# Patient Record
Sex: MALE | Race: ASIAN | NOT HISPANIC OR LATINO
[De-identification: names, ages, dates, MRNs, and addresses within clinical notes are randomized per-mention and may not be internally consistent; named-entity substitution may affect disease eponyms.]

---

## 2019-03-15 PROBLEM — Z00.00 ENCOUNTER FOR PREVENTIVE HEALTH EXAMINATION: Status: ACTIVE | Noted: 2019-03-15

## 2019-03-18 ENCOUNTER — APPOINTMENT (OUTPATIENT)
Dept: SURGERY | Facility: CLINIC | Age: 64
End: 2019-03-18
Payer: COMMERCIAL

## 2019-03-18 VITALS
BODY MASS INDEX: 37.12 KG/M2 | TEMPERATURE: 97.9 F | SYSTOLIC BLOOD PRESSURE: 122 MMHG | DIASTOLIC BLOOD PRESSURE: 79 MMHG | HEART RATE: 83 BPM | HEIGHT: 66 IN | WEIGHT: 231 LBS

## 2019-03-18 DIAGNOSIS — Z82.49 FAMILY HISTORY OF ISCHEMIC HEART DISEASE AND OTHER DISEASES OF THE CIRCULATORY SYSTEM: ICD-10-CM

## 2019-03-18 DIAGNOSIS — Z86.39 PERSONAL HISTORY OF OTHER ENDOCRINE, NUTRITIONAL AND METABOLIC DISEASE: ICD-10-CM

## 2019-03-18 DIAGNOSIS — Z83.3 FAMILY HISTORY OF DIABETES MELLITUS: ICD-10-CM

## 2019-03-18 DIAGNOSIS — K42.9 UMBILICAL HERNIA W/OUT OBSTRUCTION OR GANGRENE: ICD-10-CM

## 2019-03-18 DIAGNOSIS — Z78.9 OTHER SPECIFIED HEALTH STATUS: ICD-10-CM

## 2019-03-18 PROCEDURE — 99243 OFF/OP CNSLTJ NEW/EST LOW 30: CPT

## 2019-03-18 RX ORDER — METFORMIN HYDROCHLORIDE 625 MG/1
TABLET ORAL
Refills: 0 | Status: ACTIVE | COMMUNITY

## 2019-03-18 RX ORDER — LEVOTHYROXINE SODIUM 75 UG/1
75 TABLET ORAL
Refills: 0 | Status: ACTIVE | COMMUNITY

## 2019-03-18 NOTE — HISTORY OF PRESENT ILLNESS
[de-identified] : 62 yo obese male with h/o DM presents fro evaluation of an umbilical hernia which he has had for several years. He states the hernia has recently become symptomatic and has gotten slightly larger as well. He denies any nausea or vomiting. No h/o hernia surgery in the past.

## 2019-03-18 NOTE — ASSESSMENT
[FreeTextEntry1] : 64 yo male with a symptomatic umbilical hernia. Will schedule out patient open repair.

## 2019-03-18 NOTE — REASON FOR VISIT
[Consultation] : a consultation visit [Family Member] : family member [FreeTextEntry1] : umbilical hernia

## 2019-03-18 NOTE — PHYSICAL EXAM
[Respiratory Effort] : normal respiratory effort [Alert] : alert [Oriented to Person] : oriented to person [Oriented to Place] : oriented to place [Calm] : calm [Oriented to Time] : oriented to time [JVD] : no jugular venous distention  [Abdomen Tenderness] : ~T ~M No abdominal tenderness [Abdominal Masses] : No abdominal masses [Enlarged] : not enlarged [Tender] : nontender [de-identified] : AKIKO JORGE NAD [de-identified] : soft NT ND + partially reducible umbilical hernia  [de-identified] : EOMI

## 2019-03-25 ENCOUNTER — OUTPATIENT (OUTPATIENT)
Dept: OUTPATIENT SERVICES | Facility: HOSPITAL | Age: 64
LOS: 1 days | End: 2019-03-25
Payer: COMMERCIAL

## 2019-03-25 VITALS
OXYGEN SATURATION: 97 % | TEMPERATURE: 98 F | DIASTOLIC BLOOD PRESSURE: 82 MMHG | WEIGHT: 225.97 LBS | HEIGHT: 66 IN | HEART RATE: 84 BPM | SYSTOLIC BLOOD PRESSURE: 130 MMHG | RESPIRATION RATE: 16 BRPM

## 2019-03-25 DIAGNOSIS — R94.31 ABNORMAL ELECTROCARDIOGRAM [ECG] [EKG]: ICD-10-CM

## 2019-03-25 DIAGNOSIS — K46.9 UNSPECIFIED ABDOMINAL HERNIA WITHOUT OBSTRUCTION OR GANGRENE: ICD-10-CM

## 2019-03-25 DIAGNOSIS — E11.9 TYPE 2 DIABETES MELLITUS WITHOUT COMPLICATIONS: ICD-10-CM

## 2019-03-25 DIAGNOSIS — K42.9 UMBILICAL HERNIA WITHOUT OBSTRUCTION OR GANGRENE: ICD-10-CM

## 2019-03-25 DIAGNOSIS — T78.40XA ALLERGY, UNSPECIFIED, INITIAL ENCOUNTER: ICD-10-CM

## 2019-03-25 DIAGNOSIS — Z98.890 OTHER SPECIFIED POSTPROCEDURAL STATES: Chronic | ICD-10-CM

## 2019-03-25 DIAGNOSIS — G47.33 OBSTRUCTIVE SLEEP APNEA (ADULT) (PEDIATRIC): ICD-10-CM

## 2019-03-25 LAB
ANION GAP SERPL CALC-SCNC: 14 MMO/L — SIGNIFICANT CHANGE UP (ref 7–14)
BUN SERPL-MCNC: 10 MG/DL — SIGNIFICANT CHANGE UP (ref 7–23)
CALCIUM SERPL-MCNC: 9.5 MG/DL — SIGNIFICANT CHANGE UP (ref 8.4–10.5)
CHLORIDE SERPL-SCNC: 98 MMOL/L — SIGNIFICANT CHANGE UP (ref 98–107)
CO2 SERPL-SCNC: 25 MMOL/L — SIGNIFICANT CHANGE UP (ref 22–31)
CREAT SERPL-MCNC: 0.77 MG/DL — SIGNIFICANT CHANGE UP (ref 0.5–1.3)
GLUCOSE SERPL-MCNC: 251 MG/DL — HIGH (ref 70–99)
HBA1C BLD-MCNC: 9.6 % — HIGH (ref 4–5.6)
HCT VFR BLD CALC: 42.5 % — SIGNIFICANT CHANGE UP (ref 39–50)
HGB BLD-MCNC: 13.8 G/DL — SIGNIFICANT CHANGE UP (ref 13–17)
MCHC RBC-ENTMCNC: 27.4 PG — SIGNIFICANT CHANGE UP (ref 27–34)
MCHC RBC-ENTMCNC: 32.5 % — SIGNIFICANT CHANGE UP (ref 32–36)
MCV RBC AUTO: 84.3 FL — SIGNIFICANT CHANGE UP (ref 80–100)
NRBC # FLD: 0 K/UL — SIGNIFICANT CHANGE UP (ref 0–0)
PLATELET # BLD AUTO: 183 K/UL — SIGNIFICANT CHANGE UP (ref 150–400)
PMV BLD: 12.2 FL — SIGNIFICANT CHANGE UP (ref 7–13)
POTASSIUM SERPL-MCNC: 4 MMOL/L — SIGNIFICANT CHANGE UP (ref 3.5–5.3)
POTASSIUM SERPL-SCNC: 4 MMOL/L — SIGNIFICANT CHANGE UP (ref 3.5–5.3)
RBC # BLD: 5.04 M/UL — SIGNIFICANT CHANGE UP (ref 4.2–5.8)
RBC # FLD: 12.7 % — SIGNIFICANT CHANGE UP (ref 10.3–14.5)
SODIUM SERPL-SCNC: 137 MMOL/L — SIGNIFICANT CHANGE UP (ref 135–145)
WBC # BLD: 11.75 K/UL — HIGH (ref 3.8–10.5)
WBC # FLD AUTO: 11.75 K/UL — HIGH (ref 3.8–10.5)

## 2019-03-25 PROCEDURE — 93010 ELECTROCARDIOGRAM REPORT: CPT

## 2019-03-25 NOTE — H&P PST ADULT - LAST CARDIAC ANGIOGRAM/IMAGING
" few years ago " @ [facility] " few years ago " @ [facility]; pt denies stent

## 2019-03-25 NOTE — H&P PST ADULT - LYMPHATIC
posterior cervical R/posterior cervical L/anterior cervical L/supraclavicular R/axillary R/supraclavicular L

## 2019-03-25 NOTE — H&P PST ADULT - NSICDXPROBLEM_GEN_ALL_CORE_FT
PROBLEM DIAGNOSES  Problem: Hernia  Assessment and Plan: Ventral Hernia Repair  Pre op instructions reviewed with pt ; pt appears to have a good understanding of pre op instructions  Pt to Dr Hogan for pre op cardiac evaluation    Problem: MIGUEL ÁNGEL (obstructive sleep apnea)  Assessment and Plan: Pt to bring cpap dos  OR booking notified of MIGUEL ÁNGEL    Problem: Diabetes mellitus  Assessment and Plan: BMP, HGBA1C done 3/26/19  Pt to hold metformin evening prior to surgery and dos  FS dos  OR booking notified via fax    Problem: Abnormal EKG  Assessment and Plan: EKG done ; comparison obtained   s/w Dr Hogan ; pt cardiologist ; Dr Hogan to clear pt for surgery  EKG faxed to Dr Hogan     Problem: Allergy  Assessment and Plan: PCN, Shellfish  OR booking notified via fax

## 2019-03-25 NOTE — H&P PST ADULT - HISTORY OF PRESENT ILLNESS
Pt is a 63 y.o. male ; pt reports h/o hernia ; pt states it has increased in size ; pt to surgeon ; pt now presents for  Ventral Hernia repair

## 2019-03-27 PROBLEM — E03.9 HYPOTHYROIDISM, UNSPECIFIED: Chronic | Status: ACTIVE | Noted: 2019-03-25

## 2019-03-27 PROBLEM — R94.31 ABNORMAL ELECTROCARDIOGRAM [ECG] [EKG]: Chronic | Status: ACTIVE | Noted: 2019-03-25

## 2019-04-07 ENCOUNTER — TRANSCRIPTION ENCOUNTER (OUTPATIENT)
Age: 64
End: 2019-04-07

## 2019-04-08 ENCOUNTER — APPOINTMENT (OUTPATIENT)
Dept: SURGERY | Facility: AMBULATORY SURGERY CENTER | Age: 64
End: 2019-04-08

## 2019-04-08 ENCOUNTER — OUTPATIENT (OUTPATIENT)
Dept: OUTPATIENT SERVICES | Facility: HOSPITAL | Age: 64
LOS: 1 days | Discharge: HOME | End: 2019-04-08
Payer: COMMERCIAL

## 2019-04-08 VITALS — SYSTOLIC BLOOD PRESSURE: 132 MMHG | OXYGEN SATURATION: 99 % | DIASTOLIC BLOOD PRESSURE: 80 MMHG | HEART RATE: 85 BPM

## 2019-04-08 VITALS
RESPIRATION RATE: 16 BRPM | OXYGEN SATURATION: 97 % | WEIGHT: 225.97 LBS | TEMPERATURE: 98 F | HEIGHT: 66 IN | HEART RATE: 73 BPM | SYSTOLIC BLOOD PRESSURE: 146 MMHG | DIASTOLIC BLOOD PRESSURE: 99 MMHG

## 2019-04-08 DIAGNOSIS — Z98.890 OTHER SPECIFIED POSTPROCEDURAL STATES: Chronic | ICD-10-CM

## 2019-04-08 DIAGNOSIS — K42.9 UMBILICAL HERNIA WITHOUT OBSTRUCTION OR GANGRENE: ICD-10-CM

## 2019-04-08 LAB — GLUCOSE BLDC GLUCOMTR-MCNC: 175 MG/DL — HIGH (ref 70–99)

## 2019-04-08 PROCEDURE — 49568: CPT

## 2019-04-08 PROCEDURE — 49561: CPT

## 2019-04-08 RX ORDER — SODIUM CHLORIDE 9 MG/ML
1000 INJECTION, SOLUTION INTRAVENOUS
Qty: 0 | Refills: 0 | Status: DISCONTINUED | OUTPATIENT
Start: 2019-04-08 | End: 2019-04-23

## 2019-04-08 RX ORDER — OXYCODONE AND ACETAMINOPHEN 5; 325 MG/1; MG/1
1 TABLET ORAL
Qty: 15 | Refills: 0
Start: 2019-04-08

## 2019-04-08 NOTE — ASU DISCHARGE PLAN (ADULT/PEDIATRIC) - CALL YOUR DOCTOR IF YOU HAVE ANY OF THE FOLLOWING:
Fever greater than (need to indicate Fahrenheit or Celsius)/Bleeding that does not stop/Wound/Surgical Site with redness, or foul smelling discharge or pus Nausea and vomiting that does not stop/Wound/Surgical Site with redness, or foul smelling discharge or pus/Fever greater than (need to indicate Fahrenheit or Celsius)/Pain not relieved by Medications/Bleeding that does not stop

## 2019-04-15 ENCOUNTER — APPOINTMENT (OUTPATIENT)
Dept: SURGERY | Facility: CLINIC | Age: 64
End: 2019-04-15
Payer: COMMERCIAL

## 2019-04-15 VITALS
SYSTOLIC BLOOD PRESSURE: 137 MMHG | HEART RATE: 83 BPM | DIASTOLIC BLOOD PRESSURE: 82 MMHG | WEIGHT: 226 LBS | BODY MASS INDEX: 36.32 KG/M2 | HEIGHT: 66 IN | TEMPERATURE: 98.3 F

## 2019-04-15 DIAGNOSIS — Z09 ENCOUNTER FOR FOLLOW-UP EXAMINATION AFTER COMPLETED TREATMENT FOR CONDITIONS OTHER THAN MALIGNANT NEOPLASM: ICD-10-CM

## 2019-04-15 PROCEDURE — 99024 POSTOP FOLLOW-UP VISIT: CPT

## 2019-04-15 NOTE — ASSESSMENT
[FreeTextEntry1] : Patient feels well, has slight soreness around umbilicus.\par \par \par \par incision is c/d/i and healing well\par \par \par \par \par f/u prn

## 2019-11-06 VITALS
HEART RATE: 74 BPM | TEMPERATURE: 98 F | SYSTOLIC BLOOD PRESSURE: 115 MMHG | RESPIRATION RATE: 16 BRPM | DIASTOLIC BLOOD PRESSURE: 75 MMHG | OXYGEN SATURATION: 98 %

## 2019-11-06 RX ORDER — CHLORHEXIDINE GLUCONATE 213 G/1000ML
1 SOLUTION TOPICAL ONCE
Refills: 0 | Status: DISCONTINUED | OUTPATIENT
Start: 2019-11-07 | End: 2019-11-07

## 2019-11-06 NOTE — H&P ADULT - ASSESSMENT
64 year old M former smoker with Shellfish Allergy (Rash, Eye Swelling) and FHx CAD/MI/CABG and PMHx HTN (per Dr. Hogan's note-patient denies), Hyperlipidemia(per Dr. Hogan's note-patient denies) , DM Type II, Hypothyroidism, LBBB, MIGUEL ÁNGEL on CPAP who presented to cardiologist Dr. Hogan c/o C/P and had subsequent abnormal stress echo. In light of patient’s risk factors, CCS Angina Class III Symptoms, and abnormal stress echo patient now presents for cardiac cath with possible intervention if clinically indicated.     H/H 14.8/44.2. Denies bleeding, hematochezia, melena, hematemesis, hematuria. Aspirin 325mg x1dose and Plavix 600mg x 1dose ordered pre-cath.     Pt euvolemic on exam. Systolic murmur appreciated on auscultation. NS @50cc/hr ordered pre-cath.     Pt is diabetic. Insulin sliding scale ordered.     Hx of shellfish allergy - eye swelling and rash. Pt had cardiac cath with Dr. Hooks in 2014. No note of allergic reaction after contrast was given during procedure. Discussed with Dr. Mariscal. Pt will not be given prophylaxis.      Mallampati Class I  ASA Class II    Risks & benefits of procedure and alternative therapy have been explained to the patient including but not limited to: allergic reaction, bleeding w/possible need for blood transfusion, infection, renal and vascular compromise, limb damage, arrhythmia, stroke, vessel dissection/perforation, Myocardial infarction, emergent CABG. Informed consent obtained and in chart.

## 2019-11-06 NOTE — H&P ADULT - NSICDXFAMILYHX_GEN_ALL_CORE_FT
FAMILY HISTORY:  Family history of CABG, Brother-55  FHx: myocardial infarction, Father-55.  Maternal history of diabetes mellitus, htn ; mother

## 2019-11-06 NOTE — H&P ADULT - ADDITIONAL PE
12 lead EKG: Sinus rhythm, 1st degree AV block. LBBB. TWI I and aVL. ST depression in I, V5, V6. Changes most likely due to prior ischemia. JAIME elevation V1-V3.

## 2019-11-06 NOTE — H&P ADULT - HISTORY OF PRESENT ILLNESS
PATIENT WILL BRING MEDS PLEASE UPDATE  64 year old M former smoker with Shellfish Allergy (Rash, Eye Swelling) and FHx CAD/MI/CABG and PMHx HTN (per Dr. Hogan's note-patient denies), Hyperlipidemia(per Dr. Hogan's note-patient denies) , DM Type II, Hypothyroidism, LBBB, MIGUEL ÁNGEL on CPAP who presented to cardiologist Dr. Hogan c/o C/P. Patient reports experiencing 1 episode of left sided non-radiating chest pain while walking around in the house relieved with rest (CCS Angina Class III Symptoms). Symptoms lasted few minutes before resolving. Patient denies SOB, palpitations, dizziness, diaphoresis, N/V, syncope, LE edema, PND, orthopnea.   Stress Echo 11/2/19 revealed septal ischemia by exercise stress echocardiography, normal LVEF 60%, mild MR and TR did not worsen with exercise. Resting EKG revealed NSR with LBBB. Stress EKG revealed no evidence of EKG changes or cardiac arrhythmias. Stress test terminated due to fatigue. In light of patient’s risk factors, CCS Angina Class III Symptoms, and abnormal stress echo patient now presents for cardiac cath with possible intervention if clinically indicated. 64 year old M former smoker with Shellfish Allergy (Rash, Eye Swelling) and FHx CAD (cardiac cath 2014 with Dr. Hooks)/MI/CABG and PMHx HTN (per Dr. Hogan's note-patient denies), Hyperlipidemia(per Dr. Hogan's note-patient denies) , DM Type II, Hypothyroidism, LBBB, MIGUEL ÁNGEL on CPAP who presented to cardiologist Dr. Hogan c/o C/P. Patient reports experiencing 1 episode of left sided non-radiating chest pain while walking around in the house relieved with rest (CCS Angina Class III Symptoms). Symptoms lasted few minutes before resolving. Patient denies SOB, palpitations, dizziness, diaphoresis, N/V, syncope, LE edema, PND, orthopnea.   Stress Echo 11/2/19 revealed septal ischemia by exercise stress echocardiography, normal LVEF 60%, mild MR and TR did not worsen with exercise. Resting EKG revealed NSR with LBBB. Stress EKG revealed no evidence of EKG changes or cardiac arrhythmias. Stress test terminated due to fatigue.   In light of patient’s risk factors, CCS Angina Class III Symptoms, and abnormal stress echo patient now presents for cardiac cath with possible intervention if clinically indicated.

## 2019-11-07 ENCOUNTER — OUTPATIENT (OUTPATIENT)
Dept: OUTPATIENT SERVICES | Facility: HOSPITAL | Age: 64
LOS: 1 days | Discharge: NURSING, REHAB OR LONG-TERM CARE | End: 2019-11-07
Payer: COMMERCIAL

## 2019-11-07 DIAGNOSIS — Z98.890 OTHER SPECIFIED POSTPROCEDURAL STATES: Chronic | ICD-10-CM

## 2019-11-07 LAB
ALBUMIN SERPL ELPH-MCNC: 4.6 G/DL — SIGNIFICANT CHANGE UP (ref 3.3–5)
ALP SERPL-CCNC: 82 U/L — SIGNIFICANT CHANGE UP (ref 40–120)
ALT FLD-CCNC: 23 U/L — SIGNIFICANT CHANGE UP (ref 10–45)
ANION GAP SERPL CALC-SCNC: 12 MMOL/L — SIGNIFICANT CHANGE UP (ref 5–17)
APTT BLD: 41.2 SEC — HIGH (ref 27.5–36.3)
AST SERPL-CCNC: 13 U/L — SIGNIFICANT CHANGE UP (ref 10–40)
BASOPHILS # BLD AUTO: 0.04 K/UL — SIGNIFICANT CHANGE UP (ref 0–0.2)
BASOPHILS NFR BLD AUTO: 0.4 % — SIGNIFICANT CHANGE UP (ref 0–2)
BILIRUB SERPL-MCNC: 0.5 MG/DL — SIGNIFICANT CHANGE UP (ref 0.2–1.2)
BUN SERPL-MCNC: 11 MG/DL — SIGNIFICANT CHANGE UP (ref 7–23)
CALCIUM SERPL-MCNC: 9.4 MG/DL — SIGNIFICANT CHANGE UP (ref 8.4–10.5)
CHLORIDE SERPL-SCNC: 100 MMOL/L — SIGNIFICANT CHANGE UP (ref 96–108)
CHOLEST SERPL-MCNC: 170 MG/DL — SIGNIFICANT CHANGE UP (ref 10–199)
CK MB CFR SERPL CALC: 1.9 NG/ML — SIGNIFICANT CHANGE UP (ref 0–6.7)
CK SERPL-CCNC: 66 U/L — SIGNIFICANT CHANGE UP (ref 30–200)
CO2 SERPL-SCNC: 26 MMOL/L — SIGNIFICANT CHANGE UP (ref 22–31)
CREAT SERPL-MCNC: 0.75 MG/DL — SIGNIFICANT CHANGE UP (ref 0.5–1.3)
CRP SERPL-MCNC: 0.55 MG/DL — HIGH (ref 0–0.4)
EOSINOPHIL # BLD AUTO: 0.34 K/UL — SIGNIFICANT CHANGE UP (ref 0–0.5)
EOSINOPHIL NFR BLD AUTO: 3.3 % — SIGNIFICANT CHANGE UP (ref 0–6)
GLUCOSE BLDC GLUCOMTR-MCNC: 126 MG/DL — HIGH (ref 70–99)
GLUCOSE SERPL-MCNC: 147 MG/DL — HIGH (ref 70–99)
HBA1C BLD-MCNC: 8.2 % — HIGH (ref 4–5.6)
HCT VFR BLD CALC: 44.2 % — SIGNIFICANT CHANGE UP (ref 39–50)
HDLC SERPL-MCNC: 45 MG/DL — SIGNIFICANT CHANGE UP
HGB BLD-MCNC: 14.8 G/DL — SIGNIFICANT CHANGE UP (ref 13–17)
IMM GRANULOCYTES NFR BLD AUTO: 0.5 % — SIGNIFICANT CHANGE UP (ref 0–1.5)
INR BLD: 1.11 — SIGNIFICANT CHANGE UP (ref 0.88–1.16)
LIPID PNL WITH DIRECT LDL SERPL: 103 MG/DL — HIGH
LYMPHOCYTES # BLD AUTO: 2.52 K/UL — SIGNIFICANT CHANGE UP (ref 1–3.3)
LYMPHOCYTES # BLD AUTO: 24.8 % — SIGNIFICANT CHANGE UP (ref 13–44)
MCHC RBC-ENTMCNC: 28.4 PG — SIGNIFICANT CHANGE UP (ref 27–34)
MCHC RBC-ENTMCNC: 33.5 GM/DL — SIGNIFICANT CHANGE UP (ref 32–36)
MCV RBC AUTO: 84.7 FL — SIGNIFICANT CHANGE UP (ref 80–100)
MONOCYTES # BLD AUTO: 0.71 K/UL — SIGNIFICANT CHANGE UP (ref 0–0.9)
MONOCYTES NFR BLD AUTO: 7 % — SIGNIFICANT CHANGE UP (ref 2–14)
NEUTROPHILS # BLD AUTO: 6.52 K/UL — SIGNIFICANT CHANGE UP (ref 1.8–7.4)
NEUTROPHILS NFR BLD AUTO: 64 % — SIGNIFICANT CHANGE UP (ref 43–77)
NRBC # BLD: 0 /100 WBCS — SIGNIFICANT CHANGE UP (ref 0–0)
PLATELET # BLD AUTO: 168 K/UL — SIGNIFICANT CHANGE UP (ref 150–400)
POTASSIUM SERPL-MCNC: 4 MMOL/L — SIGNIFICANT CHANGE UP (ref 3.5–5.3)
POTASSIUM SERPL-SCNC: 4 MMOL/L — SIGNIFICANT CHANGE UP (ref 3.5–5.3)
PROT SERPL-MCNC: 8.2 G/DL — SIGNIFICANT CHANGE UP (ref 6–8.3)
PROTHROM AB SERPL-ACNC: 12.6 SEC — SIGNIFICANT CHANGE UP (ref 10–12.9)
RBC # BLD: 5.22 M/UL — SIGNIFICANT CHANGE UP (ref 4.2–5.8)
RBC # FLD: 12.4 % — SIGNIFICANT CHANGE UP (ref 10.3–14.5)
SODIUM SERPL-SCNC: 138 MMOL/L — SIGNIFICANT CHANGE UP (ref 135–145)
TOTAL CHOLESTEROL/HDL RATIO MEASUREMENT: 3.8 RATIO — SIGNIFICANT CHANGE UP (ref 3.4–9.6)
TRIGL SERPL-MCNC: 108 MG/DL — SIGNIFICANT CHANGE UP (ref 10–149)
WBC # BLD: 10.18 K/UL — SIGNIFICANT CHANGE UP (ref 3.8–10.5)
WBC # FLD AUTO: 10.18 K/UL — SIGNIFICANT CHANGE UP (ref 3.8–10.5)

## 2019-11-07 PROCEDURE — C1894: CPT

## 2019-11-07 PROCEDURE — 83036 HEMOGLOBIN GLYCOSYLATED A1C: CPT

## 2019-11-07 PROCEDURE — 93454 CORONARY ARTERY ANGIO S&I: CPT | Mod: 26

## 2019-11-07 PROCEDURE — 82962 GLUCOSE BLOOD TEST: CPT

## 2019-11-07 PROCEDURE — 93571 IV DOP VEL&/PRESS C FLO 1ST: CPT | Mod: 26,LD

## 2019-11-07 PROCEDURE — 82553 CREATINE MB FRACTION: CPT

## 2019-11-07 PROCEDURE — 93571 IV DOP VEL&/PRESS C FLO 1ST: CPT | Mod: LD

## 2019-11-07 PROCEDURE — 93010 ELECTROCARDIOGRAM REPORT: CPT

## 2019-11-07 PROCEDURE — 80053 COMPREHEN METABOLIC PANEL: CPT

## 2019-11-07 PROCEDURE — 80061 LIPID PANEL: CPT

## 2019-11-07 PROCEDURE — 82550 ASSAY OF CK (CPK): CPT

## 2019-11-07 PROCEDURE — C1887: CPT

## 2019-11-07 PROCEDURE — 93454 CORONARY ARTERY ANGIO S&I: CPT

## 2019-11-07 PROCEDURE — 93005 ELECTROCARDIOGRAM TRACING: CPT

## 2019-11-07 PROCEDURE — 85730 THROMBOPLASTIN TIME PARTIAL: CPT

## 2019-11-07 PROCEDURE — 85610 PROTHROMBIN TIME: CPT

## 2019-11-07 PROCEDURE — 85025 COMPLETE CBC W/AUTO DIFF WBC: CPT

## 2019-11-07 PROCEDURE — C1769: CPT

## 2019-11-07 PROCEDURE — 86140 C-REACTIVE PROTEIN: CPT

## 2019-11-07 RX ORDER — SODIUM CHLORIDE 9 MG/ML
500 INJECTION INTRAMUSCULAR; INTRAVENOUS; SUBCUTANEOUS
Refills: 0 | Status: DISCONTINUED | OUTPATIENT
Start: 2019-11-07 | End: 2019-11-07

## 2019-11-07 RX ORDER — CLOPIDOGREL BISULFATE 75 MG/1
1 TABLET, FILM COATED ORAL
Qty: 30 | Refills: 0
Start: 2019-11-07 | End: 2019-12-06

## 2019-11-07 RX ORDER — DEXTROSE 50 % IN WATER 50 %
25 SYRINGE (ML) INTRAVENOUS ONCE
Refills: 0 | Status: DISCONTINUED | OUTPATIENT
Start: 2019-11-07 | End: 2019-11-07

## 2019-11-07 RX ORDER — CLOPIDOGREL BISULFATE 75 MG/1
600 TABLET, FILM COATED ORAL ONCE
Refills: 0 | Status: COMPLETED | OUTPATIENT
Start: 2019-11-07 | End: 2019-11-07

## 2019-11-07 RX ORDER — DEXTROSE 50 % IN WATER 50 %
12.5 SYRINGE (ML) INTRAVENOUS ONCE
Refills: 0 | Status: DISCONTINUED | OUTPATIENT
Start: 2019-11-07 | End: 2019-11-07

## 2019-11-07 RX ORDER — SODIUM CHLORIDE 9 MG/ML
1000 INJECTION, SOLUTION INTRAVENOUS
Refills: 0 | Status: DISCONTINUED | OUTPATIENT
Start: 2019-11-07 | End: 2019-11-07

## 2019-11-07 RX ORDER — INSULIN LISPRO 100/ML
VIAL (ML) SUBCUTANEOUS ONCE
Refills: 0 | Status: DISCONTINUED | OUTPATIENT
Start: 2019-11-07 | End: 2019-11-07

## 2019-11-07 RX ORDER — ASPIRIN/CALCIUM CARB/MAGNESIUM 324 MG
325 TABLET ORAL ONCE
Refills: 0 | Status: COMPLETED | OUTPATIENT
Start: 2019-11-07 | End: 2019-11-07

## 2019-11-07 RX ORDER — DEXTROSE 50 % IN WATER 50 %
15 SYRINGE (ML) INTRAVENOUS ONCE
Refills: 0 | Status: DISCONTINUED | OUTPATIENT
Start: 2019-11-07 | End: 2019-11-07

## 2019-11-07 RX ORDER — GLUCAGON INJECTION, SOLUTION 0.5 MG/.1ML
1 INJECTION, SOLUTION SUBCUTANEOUS ONCE
Refills: 0 | Status: DISCONTINUED | OUTPATIENT
Start: 2019-11-07 | End: 2019-11-07

## 2019-11-07 RX ADMIN — SODIUM CHLORIDE 50 MILLILITER(S): 9 INJECTION INTRAMUSCULAR; INTRAVENOUS; SUBCUTANEOUS at 10:10

## 2019-11-07 RX ADMIN — CLOPIDOGREL BISULFATE 600 MILLIGRAM(S): 75 TABLET, FILM COATED ORAL at 10:10

## 2019-11-07 RX ADMIN — Medication 325 MILLIGRAM(S): at 10:10

## 2019-11-07 NOTE — PROGRESS NOTE ADULT - SUBJECTIVE AND OBJECTIVE BOX
Interventional Cardiology PA SDA Discharge Note      Patient without complaints. Ambulated and voided without difficulty.    Afebrile, vital signs stable.    Ext: Right Radial: no hematoma, no bleed, dressing C/D/I.    Pulses: Radial 2+ intact to baseline.      A/P:  64 year old M former smoker with Shellfish Allergy (Rash, Eye Swelling) and FHx CAD (cardiac cath 2014 with Dr. Hooks)/MI/CABG and PMHx HTN (per Dr. Hogan's note-patient denies), Hyperlipidemia(per Dr. Hogan's note-patient denies) , DM Type II, Hypothyroidism, LBBB, MIGUEL ÁNGEL on CPAP who presented to cardiologist Dr. Hogan c/o C/P. Patient reports experiencing 1 episode of left sided non-radiating chest pain while walking around in the house relieved with rest (CCS Angina Class III Symptoms). Symptoms lasted few minutes before resolving. Patient denies SOB, palpitations, dizziness, diaphoresis, N/V, syncope, LE edema, PND, orthopnea. Stress Echo 11/2/19 revealed septal ischemia by exercise stress echocardiography, normal LVEF 60%, mild MR and TR did not worsen with exercise. Resting EKG revealed NSR with LBBB. Stress EKG revealed no evidence of EKG changes or cardiac arrhythmias. Stress test terminated due to fatigue. In light of patient’s risk factors, CCS Angina Class III Symptoms, and abnormal stress echo patient now presents for cardiac cath with possible intervention if clinically indicated. Patient is now s/p Diagnostic Cardiac Catheterization 11/7/2019: mLAD: 55-60% IFR 0.94 negative, pLAD: 20%, LM: normal, LCx: mild irregularities, OM1: mild irregularities, Ramus: normal, pRCA: 40% large dominant.  1.	Stable for discharge as per attending Dr. Hooks after bed rest, patient voids, access site stable, and 30 minutes of ambulation.  2.	Follow-up with PMD/Cardiologist Dr. Hogan in 1-2 weeks.  3.	Discharged forms signed and copies in chart.

## 2019-11-07 NOTE — PROGRESS NOTE ADULT - SUBJECTIVE AND OBJECTIVE BOX
CORONARY ANGIOGRAPHY  11/7/19    Indication: unstable angina, stress echo    Conclusion  Frailty Index: 3    1. Coronary Angiography  LM: normal  LAD: proximal 20% stenosis, mid 55-60% stenosis, IFR negative 0.94, D10 mild luminal irregularities  LCx: mild luminal irregularities, OM1 mild luminal irregularities  Ramus: normal  RCA: Large, dominant vessel, proximal 40% stenosis, Mid ectasia noted     Recommendations  single vessel CAD of LAD, IFR negative =0.94  medical management  f/u with Dr. Shaik Hogan    Access: R radial artery, D stat

## 2022-05-09 PROBLEM — E11.9 TYPE 2 DIABETES MELLITUS WITHOUT COMPLICATIONS: Chronic | Status: ACTIVE | Noted: 2019-03-25

## 2022-05-11 ENCOUNTER — NON-APPOINTMENT (OUTPATIENT)
Age: 67
End: 2022-05-11

## 2022-05-12 ENCOUNTER — APPOINTMENT (OUTPATIENT)
Dept: ORTHOPEDIC SURGERY | Facility: CLINIC | Age: 67
End: 2022-05-12
Payer: MEDICARE

## 2022-05-12 DIAGNOSIS — M76.829 POSTERIOR TIBIAL TENDINITIS, UNSPECIFIED LEG: ICD-10-CM

## 2022-05-12 PROCEDURE — 73610 X-RAY EXAM OF ANKLE: CPT | Mod: LT

## 2022-05-12 PROCEDURE — 99203 OFFICE O/P NEW LOW 30 MIN: CPT

## 2022-05-12 NOTE — PHYSICAL EXAM
[de-identified] : Oriented to time, place, person\par Mood: Normal\par Affect: Normal\par Appearance: Healthy, well appearing, no acute distress.\par Gait: Normal\par Assistive Devices: None\par \par Left Ankle exam:\par \par Skin: Clean, dry, intact\par Inspection: No obvious malalignment, +lateral ankle swelling, no effusion, +flat foot deformity, +bony deformity to the medial malleolus , +too many toes sign. \par Pulses: 2+ DP/PT pulses \par ROM: 15 degrees of dorsiflexion, 30 degrees of plantarflexion, normal subtalar motion. \par Tenderness: No tenderness over the lateral malleolus, no CFL/ATFL/PTFL pain. No medial malleolus pain, no deltoid ligament pain. No proximal fibular pain. No heel pain.\par Stability: Negative anterior drawer, negative posterior drawer.\par Strength: 5/5 TA/GS/EHL 5/5 inversion/eversion\par Neuro: In tact to light touch throughout\par Additional tests: Negative syndesmosis squeeze test.  [de-identified] : Images were reviewed from [facility] dated 4.21.2022\par \par 3 views of the left ankle that show no acute fracture or dislocation. Ankle mortise is intact. There is mild subtalar arthritis. Pes planus deformity. Mild ankle arthritis. \par \par MRI left ankle dated 4.21.2022 shows evidence of pes planus with tearing of posterior tibialis tendon.  Tenosynovitis with arthritic disease tibiotalar and subtalar joints.

## 2022-05-12 NOTE — ADDENDUM
[FreeTextEntry1] : This note was written by Loida Cleary on 05/12/2022 acting solely as a scribe for Dr. Bruce Lafleur.\par \par All medical record entries made by the Scribe were at my, Dr. Bruce Lafleur, direction and personally dictated by me on 05/12/2022. I have personally reviewed the chart and agree that the record accurately reflects my personal performance of the history, physical exam, assessment and plan.

## 2022-05-12 NOTE — HISTORY OF PRESENT ILLNESS
[de-identified] : 67 year old male presents today with increasing left lower extremity pain x 3 weeks. No injury reported. Localized pain to the lateral aspect of the ankle with radiation upward. The pain is intermittent brought on with prolonged walking and sitting. He is applying Voltaren cream/ taking Celebrex. Denies numbness or tingling. His PMD ordered x-ray and MRI for the ankle he has brought with him for review.  Patient has noticed a chronic foot deformity with loss of arch support of his foot.\par \par The patient's past medical history, past surgical history, medications and allergies were reviewed by me today with the patient and documented accordingly. In addition, the patient's family and social history, which were noncontributory to this visit, were reviewed also.

## 2022-05-12 NOTE — DISCUSSION/SUMMARY
[de-identified] : 68 y/o male with left acquired flatfoot deformity\par \par The patient has developed an adult acquired flatfoot deformity of the right foot. 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 This involves tendon degeneration to the medial ankle that ultimately causes loss of arch support.\par \par The patient is currently in III-IV of the disease process. Treatment would include custom molded in shoe orthosis versus ankle-foot orthotics. Surgical treatment is typically indicated for failure of nonoperative management or for advanced stages of the degenerative condition. Referral to Orthopaedic Foot and Ankle subspecialist is indicated given severity of disease process at this time.\par \par Follow-up with Ortho foot and ankle.

## 2022-05-17 ENCOUNTER — APPOINTMENT (OUTPATIENT)
Dept: ORTHOPEDIC SURGERY | Facility: CLINIC | Age: 67
End: 2022-05-17
Payer: MEDICARE

## 2022-05-17 VITALS — WEIGHT: 226 LBS | BODY MASS INDEX: 36.32 KG/M2 | HEIGHT: 66 IN

## 2022-05-17 DIAGNOSIS — M21.42 FLAT FOOT [PES PLANUS] (ACQUIRED), RIGHT FOOT: ICD-10-CM

## 2022-05-17 DIAGNOSIS — M21.41 FLAT FOOT [PES PLANUS] (ACQUIRED), RIGHT FOOT: ICD-10-CM

## 2022-05-17 DIAGNOSIS — M62.89 OTHER SPECIFIED DISORDERS OF MUSCLE: ICD-10-CM

## 2022-05-17 DIAGNOSIS — M84.369A STRESS FRACTURE, UNSPECIFIED TIBIA AND FIBULA, INITIAL ENCOUNTER FOR FRACTURE: ICD-10-CM

## 2022-05-17 DIAGNOSIS — M76.822 POSTERIOR TIBIAL TENDINITIS, LEFT LEG: ICD-10-CM

## 2022-05-17 DIAGNOSIS — M76.821 POSTERIOR TIBIAL TENDINITIS, RIGHT LEG: ICD-10-CM

## 2022-05-17 DIAGNOSIS — M25.872 OTHER SPECIFIED JOINT DISORDERS, LEFT ANKLE AND FOOT: ICD-10-CM

## 2022-05-17 DIAGNOSIS — M94.279 CHONDROMALACIA, UNSPECIFIED ANKLE AND JOINTS OF FOOT: ICD-10-CM

## 2022-05-17 PROCEDURE — 27786 TREATMENT OF ANKLE FRACTURE: CPT

## 2022-05-17 PROCEDURE — 73620 X-RAY EXAM OF FOOT: CPT | Mod: 50

## 2022-05-17 PROCEDURE — 73610 X-RAY EXAM OF ANKLE: CPT | Mod: 50

## 2022-05-17 PROCEDURE — 99214 OFFICE O/P EST MOD 30 MIN: CPT | Mod: 57

## 2022-05-19 PROBLEM — M62.89 TIGHTNESS OF BOTH GASTROCNEMIUS MUSCLES: Status: ACTIVE | Noted: 2022-05-19

## 2022-05-19 PROBLEM — M21.41 ACQUIRED PES PLANUS OF BOTH FEET: Status: ACTIVE | Noted: 2022-05-19

## 2022-05-19 PROBLEM — M84.369A: Status: ACTIVE | Noted: 2022-05-17

## 2022-05-19 PROBLEM — M25.872 SUBFIBULAR IMPINGEMENT OF LEFT LOWER EXTREMITY: Status: ACTIVE | Noted: 2022-05-19

## 2022-05-19 PROBLEM — M76.821 POSTERIOR TIBIAL TENDON DYSFUNCTION, RIGHT: Status: ACTIVE | Noted: 2022-05-19

## 2022-05-19 PROBLEM — M76.822 POSTERIOR TIBIAL TENDON DYSFUNCTION, LEFT: Status: ACTIVE | Noted: 2022-05-19

## 2022-05-19 PROBLEM — M94.279 CHONDROMALACIA OF ANKLE OR JOINT OF FOOT: Status: ACTIVE | Noted: 2022-05-19

## 2022-05-19 PROBLEM — M21.41 ACQUIRED PES PLANUS OF BOTH FEET: Status: RESOLVED | Noted: 2022-05-17 | Resolved: 2022-05-19

## 2022-06-02 ENCOUNTER — NON-APPOINTMENT (OUTPATIENT)
Age: 67
End: 2022-06-02

## 2023-04-26 VITALS
OXYGEN SATURATION: 97 % | DIASTOLIC BLOOD PRESSURE: 83 MMHG | WEIGHT: 225.09 LBS | HEART RATE: 71 BPM | RESPIRATION RATE: 16 BRPM | SYSTOLIC BLOOD PRESSURE: 142 MMHG | HEIGHT: 66 IN | TEMPERATURE: 98 F

## 2023-04-26 RX ORDER — CHLORHEXIDINE GLUCONATE 213 G/1000ML
1 SOLUTION TOPICAL ONCE
Refills: 0 | Status: DISCONTINUED | OUTPATIENT
Start: 2023-04-27 | End: 2023-05-11

## 2023-04-26 RX ORDER — CELECOXIB 200 MG/1
1 CAPSULE ORAL
Qty: 0 | Refills: 0 | DISCHARGE

## 2023-04-26 NOTE — H&P ADULT - ASSESSMENT
68M, former smoker, w/ Shellfish allergy (no reaction to contrast), FHx of CAD/MI and PMHx of HTN, HLD, non obstructive CAD ([facility] 11/2019 report below), new HFmEF (LVEF 45%), DM-II, LBBB, Hypothyroidism, MIGUEL ÁNGEL on CPAP presents to [facility] for recommended cardiac catheterization with possible intervention if clinically indicated in light of  light of pts risk factors, CCS class II anginal symptoms, abnormal exercise stress echo and newly reduced LVEF.     ASA II          Mallampati III    Patient is a candidate for sedation: yes  Sedation: Moderate    Risks & benefits of procedure and alternative therapy have been explained to the patient including but not limited to: allergic reaction, bleeding w/possible need for blood transfusion, infection, renal and vascular compromise, limb damage, arrhythmia, stroke, vessel dissection/perforation, Myocardial infarction, emergent CABG. Informed consent obtained and in chart.       Patient denies bleeding, BRBPR, melena, hematuria, hematemesis.      [] Load: Not taking DAPT, load with ASA 325mg/Plavix 600mg  [] Fluids: Cr 0.6, euvolemic on exam- no crackles/rales or LE swelling. Will give  cc Bolus IV x 1 followed by NS 75 cc/hr x 2 hrs per hydration protocol   [] Patient with shellfish allergy- per discussion with Dr. Hooks no need to premedicate as he's previously had cardiac cath w/o adverse reactions   [] Labs- WBC 11.13, afebrile, VSS, no constitutional symptoms  [] Electrolytes: K 4, Mg 2.0    68M, former smoker, w/ Shellfish allergy (no reaction to contrast), FHx of CAD/MI and PMHx of HTN, HLD, non obstructive CAD ([facility] 11/2019 report below), new HFmEF (LVEF 45%), DM-II, LBBB, Hypothyroidism, MIGUEL ÁNGEL on CPAP presents to [facility] for recommended cardiac catheterization with possible intervention if clinically indicated in light of  light of pts risk factors, CCS class II anginal symptoms, abnormal exercise stress echo and newly reduced LVEF.     ASA II          Mallampati III    Patient is a candidate for sedation: yes  Sedation: Moderate    Risks & benefits of procedure and alternative therapy have been explained to the patient including but not limited to: allergic reaction, bleeding w/possible need for blood transfusion, infection, renal and vascular compromise, limb damage, arrhythmia, stroke, vessel dissection/perforation, Myocardial infarction, emergent CABG. Informed consent obtained and in chart.       Patient denies bleeding, BRBPR, melena, hematuria, hematemesis.      [] Load: Not taking DAPT, load with ASA 325mg/Plavix 600mg  [] Fluids: Cr 0.6 LVEF 45% , euvolemic on exam- no crackles/rales or LE swelling. Will give  cc Bolus IV x 1 followed by NS 75 cc/hr x 2 hrs per hydration protocol   [] Patient with shellfish allergy- per discussion with Dr. Hooks no need to premedicate as he's previously had cardiac cath w/o adverse reactions   [] Labs- WBC 11.13, afebrile, VSS, no constitutional symptoms  [] Electrolytes: K 4, Mg 2.0

## 2023-04-26 NOTE — H&P ADULT - NSHPLABSRESULTS_GEN_ALL_CORE
13.9   11.13 )-----------( 197      ( 27 Apr 2023 12:07 )             41.4       04-27    137  |  102  |  8   ----------------------------<  133<H>  4.0   |  25  |  0.60    Ca    9.2      27 Apr 2023 12:07  Mg     2.0     04-27    TPro  7.3  /  Alb  4.1  /  TBili  0.2  /  DBili  x   /  AST  22  /  ALT  44  /  AlkPhos  81  04-27      PT/INR - ( 27 Apr 2023 12:07 )   PT: 12.6 sec;   INR: 1.06          PTT - ( 27 Apr 2023 12:07 )  PTT:36.4 sec    CARDIAC MARKERS ( 27 Apr 2023 12:07 )  x     / x     / 60 U/L / x     / 1.8 ng/mL            EKG 04/27/23: 69 BPM, left axis deviation, known LBBB   1st degree heart block

## 2023-04-26 NOTE — H&P ADULT - NSICDXPASTMEDICALHX_GEN_ALL_CORE_FT
PAST MEDICAL HISTORY:  Abnormal EKG     CAD (coronary atherosclerotic disease)     Diabetes mellitus     HTN (hypertension)     Hypothyroidism

## 2023-04-26 NOTE — H&P ADULT - HISTORY OF PRESENT ILLNESS
Cardiologist - Dr. Shaik Hogan  Pharmacy -  Esc[facility] -    68M, former smoker, w/ Shellfish allergy (no reaction to contrast), FHx of CAD/MI and PMHx of HTN, HLD, non obstructive CAD ([facility] 11/2019 report below), DM-II, LBBB, Hypothyroidism, MIGUEL ÁNGEL on CPAP, initially presented to outpt cardiologist Dr. Hogan c/o CP and SOB.    Cardiac Cath 11/7/19: mLAD 55-60% (IFR 0.94 negative), pLAD 20%, LM normal, LCx mild irregularities, OM1 mild irregularities, Ramus normal, pRCA 40% (large dominant). Cardiologist - Dr. Shaik Hogan  Pharmacy -  [facility] -    68M, former smoker, w/ Shellfish allergy (no reaction to contrast), FHx of CAD/MI and PMHx of HTN, HLD, non obstructive CAD ([facility] 11/2019 report below), new HFmEF (LVEF 45%), DM-II, LBBB, Hypothyroidism, MIGUEL ÁNGEL on CPAP, initially presented to outpt cardiologist Dr. Hogan c/o CP and SOB. He denies any ___ palpitations, dizziness, syncope, diaphoresis, fatigue, LE edema, orthopnea, PND, N/V, fever, chills or recent sick contact.     Exercise Stress Echo 3/8/23: septal ischemia, LVEF 45%. Cardiac Cath 11/7/19 @ [facility]: mLAD 55-60% (IFR 0.94 negative), pLAD 20%, LM normal, LCx mild irregularities, OM1 mild irregularities, Ramus normal, pRCA 40% (large dominant).    In light of pts risk factors, CCS class __ anginal symptoms, abnormal exercise stress echo and newly reduced LVEF, pt now presents to [facility] for recommended cardiac catheterization with possible intervention if clinically indicated.  Cardiologist - Dr. Shaik Hogan  Pharmacy - [facility] ([phone])   Escort - Son    68M, former smoker, w/ Shellfish allergy (no reaction to contrast), FHx of CAD/MI and PMHx of HTN, HLD, non obstructive CAD ([facility] 11/2019 report below), new HFmEF (LVEF 45%), DM-II, LBBB, Hypothyroidism, MIGUEL ÁNGEL on CPAP, initially presented to outpt cardiologist Dr. Hogan c/o CP and SOB. Endorses intermittent episodes of dizziness, no syncope or LOC.  Denies any  palpitations,  fatigue, LE edema, orthopnea, PND, N/V, fever, chills or recent sick contact.     Exercise Stress Echo 3/8/23: septal ischemia, LVEF 45%. Cardiac Cath 11/7/19 @ [facility]: mLAD 55-60% (IFR 0.94 negative), pLAD 20%, LM normal, LCx mild irregularities, OM1 mild irregularities, Ramus normal, pRCA 40% (large dominant).    In light of pts risk factors, CCS class II anginal symptoms, abnormal exercise stress echo and newly reduced LVEF, pt now presents to [facility] for recommended cardiac catheterization with possible intervention if clinically indicated.

## 2023-04-27 ENCOUNTER — OUTPATIENT (OUTPATIENT)
Dept: OUTPATIENT SERVICES | Facility: HOSPITAL | Age: 68
LOS: 1 days | Discharge: HOME | End: 2023-04-27
Payer: MEDICARE

## 2023-04-27 DIAGNOSIS — Z98.890 OTHER SPECIFIED POSTPROCEDURAL STATES: Chronic | ICD-10-CM

## 2023-04-27 LAB
A1C WITH ESTIMATED AVERAGE GLUCOSE RESULT: 9.1 % — HIGH (ref 4–5.6)
ALBUMIN SERPL ELPH-MCNC: 4.1 G/DL — SIGNIFICANT CHANGE UP (ref 3.3–5)
ALP SERPL-CCNC: 81 U/L — SIGNIFICANT CHANGE UP (ref 40–120)
ALT FLD-CCNC: 44 U/L — SIGNIFICANT CHANGE UP (ref 10–45)
ANION GAP SERPL CALC-SCNC: 10 MMOL/L — SIGNIFICANT CHANGE UP (ref 5–17)
APTT BLD: 36.4 SEC — HIGH (ref 27.5–35.5)
AST SERPL-CCNC: 22 U/L — SIGNIFICANT CHANGE UP (ref 10–40)
BASOPHILS # BLD AUTO: 0.02 K/UL — SIGNIFICANT CHANGE UP (ref 0–0.2)
BASOPHILS NFR BLD AUTO: 0.2 % — SIGNIFICANT CHANGE UP (ref 0–2)
BILIRUB SERPL-MCNC: 0.2 MG/DL — SIGNIFICANT CHANGE UP (ref 0.2–1.2)
BUN SERPL-MCNC: 8 MG/DL — SIGNIFICANT CHANGE UP (ref 7–23)
CALCIUM SERPL-MCNC: 9.2 MG/DL — SIGNIFICANT CHANGE UP (ref 8.4–10.5)
CHLORIDE SERPL-SCNC: 102 MMOL/L — SIGNIFICANT CHANGE UP (ref 96–108)
CHOLEST SERPL-MCNC: 145 MG/DL — SIGNIFICANT CHANGE UP
CK MB CFR SERPL CALC: 1.8 NG/ML — SIGNIFICANT CHANGE UP (ref 0–6.7)
CK SERPL-CCNC: 60 U/L — SIGNIFICANT CHANGE UP (ref 30–200)
CO2 SERPL-SCNC: 25 MMOL/L — SIGNIFICANT CHANGE UP (ref 22–31)
CREAT SERPL-MCNC: 0.6 MG/DL — SIGNIFICANT CHANGE UP (ref 0.5–1.3)
EGFR: 105 ML/MIN/1.73M2 — SIGNIFICANT CHANGE UP
EOSINOPHIL # BLD AUTO: 0.31 K/UL — SIGNIFICANT CHANGE UP (ref 0–0.5)
EOSINOPHIL NFR BLD AUTO: 2.8 % — SIGNIFICANT CHANGE UP (ref 0–6)
ESTIMATED AVERAGE GLUCOSE: 214 MG/DL — HIGH (ref 68–114)
GLUCOSE BLDC GLUCOMTR-MCNC: 109 MG/DL — HIGH (ref 70–99)
GLUCOSE BLDC GLUCOMTR-MCNC: 123 MG/DL — HIGH (ref 70–99)
GLUCOSE SERPL-MCNC: 133 MG/DL — HIGH (ref 70–99)
HCT VFR BLD CALC: 41.4 % — SIGNIFICANT CHANGE UP (ref 39–50)
HDLC SERPL-MCNC: 40 MG/DL — LOW
HGB BLD-MCNC: 13.9 G/DL — SIGNIFICANT CHANGE UP (ref 13–17)
IMM GRANULOCYTES NFR BLD AUTO: 0.4 % — SIGNIFICANT CHANGE UP (ref 0–0.9)
INR BLD: 1.06 — SIGNIFICANT CHANGE UP (ref 0.88–1.16)
ISTAT ACTK (ACTIVATED CLOTTING TIME KAOLIN): 371 SEC — HIGH (ref 74–137)
LIPID PNL WITH DIRECT LDL SERPL: 81 MG/DL — SIGNIFICANT CHANGE UP
LYMPHOCYTES # BLD AUTO: 2.79 K/UL — SIGNIFICANT CHANGE UP (ref 1–3.3)
LYMPHOCYTES # BLD AUTO: 25.1 % — SIGNIFICANT CHANGE UP (ref 13–44)
MAGNESIUM SERPL-MCNC: 2 MG/DL — SIGNIFICANT CHANGE UP (ref 1.6–2.6)
MCHC RBC-ENTMCNC: 28.5 PG — SIGNIFICANT CHANGE UP (ref 27–34)
MCHC RBC-ENTMCNC: 33.6 GM/DL — SIGNIFICANT CHANGE UP (ref 32–36)
MCV RBC AUTO: 85 FL — SIGNIFICANT CHANGE UP (ref 80–100)
MONOCYTES # BLD AUTO: 0.68 K/UL — SIGNIFICANT CHANGE UP (ref 0–0.9)
MONOCYTES NFR BLD AUTO: 6.1 % — SIGNIFICANT CHANGE UP (ref 2–14)
NEUTROPHILS # BLD AUTO: 7.28 K/UL — SIGNIFICANT CHANGE UP (ref 1.8–7.4)
NEUTROPHILS NFR BLD AUTO: 65.4 % — SIGNIFICANT CHANGE UP (ref 43–77)
NON HDL CHOLESTEROL: 105 MG/DL — SIGNIFICANT CHANGE UP
NRBC # BLD: 0 /100 WBCS — SIGNIFICANT CHANGE UP (ref 0–0)
PLATELET # BLD AUTO: 197 K/UL — SIGNIFICANT CHANGE UP (ref 150–400)
POTASSIUM SERPL-MCNC: 4 MMOL/L — SIGNIFICANT CHANGE UP (ref 3.5–5.3)
POTASSIUM SERPL-SCNC: 4 MMOL/L — SIGNIFICANT CHANGE UP (ref 3.5–5.3)
PROT SERPL-MCNC: 7.3 G/DL — SIGNIFICANT CHANGE UP (ref 6–8.3)
PROTHROM AB SERPL-ACNC: 12.6 SEC — SIGNIFICANT CHANGE UP (ref 10.5–13.4)
RBC # BLD: 4.87 M/UL — SIGNIFICANT CHANGE UP (ref 4.2–5.8)
RBC # FLD: 12.5 % — SIGNIFICANT CHANGE UP (ref 10.3–14.5)
SODIUM SERPL-SCNC: 137 MMOL/L — SIGNIFICANT CHANGE UP (ref 135–145)
TRIGL SERPL-MCNC: 119 MG/DL — SIGNIFICANT CHANGE UP
WBC # BLD: 11.13 K/UL — HIGH (ref 3.8–10.5)
WBC # FLD AUTO: 11.13 K/UL — HIGH (ref 3.8–10.5)

## 2023-04-27 PROCEDURE — 82550 ASSAY OF CK (CPK): CPT

## 2023-04-27 PROCEDURE — 82553 CREATINE MB FRACTION: CPT

## 2023-04-27 PROCEDURE — 99152 MOD SED SAME PHYS/QHP 5/>YRS: CPT

## 2023-04-27 PROCEDURE — 85610 PROTHROMBIN TIME: CPT

## 2023-04-27 PROCEDURE — 83735 ASSAY OF MAGNESIUM: CPT

## 2023-04-27 PROCEDURE — 93458 L HRT ARTERY/VENTRICLE ANGIO: CPT

## 2023-04-27 PROCEDURE — 80061 LIPID PANEL: CPT

## 2023-04-27 PROCEDURE — 99153 MOD SED SAME PHYS/QHP EA: CPT

## 2023-04-27 PROCEDURE — 85025 COMPLETE CBC W/AUTO DIFF WBC: CPT

## 2023-04-27 PROCEDURE — 93010 ELECTROCARDIOGRAM REPORT: CPT

## 2023-04-27 PROCEDURE — 83036 HEMOGLOBIN GLYCOSYLATED A1C: CPT

## 2023-04-27 PROCEDURE — 93571 IV DOP VEL&/PRESS C FLO 1ST: CPT | Mod: LD

## 2023-04-27 PROCEDURE — C1887: CPT

## 2023-04-27 PROCEDURE — 93005 ELECTROCARDIOGRAM TRACING: CPT

## 2023-04-27 PROCEDURE — 85347 COAGULATION TIME ACTIVATED: CPT

## 2023-04-27 PROCEDURE — C1769: CPT

## 2023-04-27 PROCEDURE — 82962 GLUCOSE BLOOD TEST: CPT

## 2023-04-27 PROCEDURE — C1894: CPT

## 2023-04-27 PROCEDURE — 93458 L HRT ARTERY/VENTRICLE ANGIO: CPT | Mod: 26

## 2023-04-27 PROCEDURE — 85730 THROMBOPLASTIN TIME PARTIAL: CPT

## 2023-04-27 PROCEDURE — 36415 COLL VENOUS BLD VENIPUNCTURE: CPT

## 2023-04-27 PROCEDURE — 80053 COMPREHEN METABOLIC PANEL: CPT

## 2023-04-27 PROCEDURE — 93571 IV DOP VEL&/PRESS C FLO 1ST: CPT | Mod: 26,LD

## 2023-04-27 RX ORDER — SODIUM CHLORIDE 9 MG/ML
500 INJECTION INTRAMUSCULAR; INTRAVENOUS; SUBCUTANEOUS
Refills: 0 | Status: DISCONTINUED | OUTPATIENT
Start: 2023-04-27 | End: 2023-04-27

## 2023-04-27 RX ORDER — SODIUM CHLORIDE 9 MG/ML
250 INJECTION INTRAMUSCULAR; INTRAVENOUS; SUBCUTANEOUS ONCE
Refills: 0 | Status: COMPLETED | OUTPATIENT
Start: 2023-04-27 | End: 2023-04-27

## 2023-04-27 RX ORDER — SODIUM CHLORIDE 9 MG/ML
1000 INJECTION INTRAMUSCULAR; INTRAVENOUS; SUBCUTANEOUS
Refills: 0 | Status: DISCONTINUED | OUTPATIENT
Start: 2023-04-27 | End: 2023-04-27

## 2023-04-27 RX ORDER — ASPIRIN/CALCIUM CARB/MAGNESIUM 324 MG
325 TABLET ORAL ONCE
Refills: 0 | Status: COMPLETED | OUTPATIENT
Start: 2023-04-27 | End: 2023-04-27

## 2023-04-27 RX ORDER — SODIUM CHLORIDE 9 MG/ML
1000 INJECTION INTRAMUSCULAR; INTRAVENOUS; SUBCUTANEOUS
Refills: 0 | Status: DISCONTINUED | OUTPATIENT
Start: 2023-04-27 | End: 2023-05-11

## 2023-04-27 RX ORDER — CLOPIDOGREL BISULFATE 75 MG/1
600 TABLET, FILM COATED ORAL ONCE
Refills: 0 | Status: COMPLETED | OUTPATIENT
Start: 2023-04-27 | End: 2023-04-27

## 2023-04-27 RX ADMIN — SODIUM CHLORIDE 500 MILLILITER(S): 9 INJECTION INTRAMUSCULAR; INTRAVENOUS; SUBCUTANEOUS at 13:09

## 2023-04-27 RX ADMIN — CLOPIDOGREL BISULFATE 600 MILLIGRAM(S): 75 TABLET, FILM COATED ORAL at 13:09

## 2023-04-27 RX ADMIN — Medication 325 MILLIGRAM(S): at 13:09

## 2023-04-27 RX ADMIN — SODIUM CHLORIDE 75 MILLILITER(S): 9 INJECTION INTRAMUSCULAR; INTRAVENOUS; SUBCUTANEOUS at 15:15

## 2023-04-27 RX ADMIN — SODIUM CHLORIDE 75 MILLILITER(S): 9 INJECTION INTRAMUSCULAR; INTRAVENOUS; SUBCUTANEOUS at 13:10

## 2023-04-27 NOTE — PROGRESS NOTE ADULT - SUBJECTIVE AND OBJECTIVE BOX
Interventional Cardiology PA SDA Discharge Note    s/p cardiac cath RRA access: 1 vCAD with  two tandem lesions  mLAD 60% and 65% - FFR 0.89  D1 50%  rest LI   EDP 7mmHg  for medical  management    Patient without complaints. Ambulated and voided without difficulties    Afebrile, VSS    Ext:  right Radial : no hematoma,  no  bleeding, dressing; C/D/I      Pulses:    intact RAD 2+      A/P:    68M, former smoker, w/ Shellfish allergy (no reaction to contrast), FHx of CAD/MI and PMHx of HTN, HLD, non obstructive CAD ([facility] 11/2019 report below), new HFmEF (LVEF 45%), DM-II, LBBB, Hypothyroidism, MIGUEL ÁNGEL on CPAP presents to [facility] for recommended cardiac catheterization with possible intervention if clinically indicated in light of  light of pts risk factors, CCS class II anginal symptoms, abnormal exercise stress echo and newly reduced       s/p cardiac cath RRA access: 1 vCAD with  two tandem lesions  mLAD 60% and 65% - FFR 0.89  D1 50%  rest LI         1.	Stable for discharge as per attending Dr. Hooks  after bed rest, wrist stable and 30 minutes of ambulation.  2.	Follow-up with PMD/Cardiologist Shaik Edison  in 1-2 weeks  3.	Discharged forms signed and copies in chart

## 2023-04-28 DIAGNOSIS — I25.118 ATHEROSCLEROTIC HEART DISEASE OF NATIVE CORONARY ARTERY WITH OTHER FORMS OF ANGINA PECTORIS: ICD-10-CM

## 2023-04-28 DIAGNOSIS — R94.39 ABNORMAL RESULT OF OTHER CARDIOVASCULAR FUNCTION STUDY: ICD-10-CM

## 2023-10-13 NOTE — H&P ADULT - VTE RISK ASSESSMENT
Encounter Date: 10/13/2023       History     Chief Complaint   Patient presents with    General Illness     PATIENT ELOPED        Review of patient's allergies indicates:  No Known Allergies  Past Medical History:   Diagnosis Date    Anemia     Chlamydia     Cholelithiases     COVID-19 2020    Hypertension     STD (sexually transmitted disease)     chlamydia    Thyroid disease     Vitamin D deficiency      Past Surgical History:   Procedure Laterality Date     SECTION      x 3    CHOLECYSTECTOMY      HYSTERECTOMY      LAPAROSCOPIC SALPINGECTOMY Bilateral 6/3/2019    Procedure: SALPINGECTOMY, LAPAROSCOPIC;  Surgeon: Eleuterio Shepard MD;  Location: [facility] OR;  Service: OB/GYN;  Laterality: Bilateral;    LAPAROSCOPIC TOTAL HYSTERECTOMY N/A 6/3/2019    Procedure: HYSTERECTOMY, TOTAL, LAPAROSCOPIC;  Surgeon: Eleuterio Shepard MD;  Location: [facility] OR;  Service: OB/GYN;  Laterality: N/A;    TUBAL LIGATION       Family History   Problem Relation Age of Onset    Irritable bowel syndrome Mother     Hypertension Mother     Kidney failure Mother     Kidney disease Father     Breast cancer Neg Hx     Colon cancer Neg Hx     Ovarian cancer Neg Hx      Social History     Tobacco Use    Smoking status: Former     Types: Vaping with nicotine    Smokeless tobacco: Never   Substance Use Topics    Alcohol use: No    Drug use: No     Review of Systems    Physical Exam     Initial Vitals [10/13/23 1202]   BP Pulse Resp Temp SpO2   133/77 77 18 98.4 °F (36.9 °C) 99 %      MAP       --         Physical Exam    ED Course   Procedures  Labs Reviewed   SARS-COV-2 RNA AMPLIFICATION, QUAL - Abnormal; Notable for the following components:       Result Value    SARS-CoV-2 RNA, Amplification, Qual Positive (*)     All other components within normal limits   INFLUENZA A & B BY MOLECULAR   GROUP A STREP, MOLECULAR     EKG Readings: (Independently Interpreted)   Previous EKG: Compared with most recent EKG Previous EKG Date:  3/6/2023.   NSR at 73 bpm. Normal axis. Normal intervals. No STEMI.       Imaging Results    None          Medications - No data to display  Medical Decision Making                             Clinical Impression:   Final diagnoses:  [R07.9] Acute chest pain  [U07.1] COVID-19  [Z53.21] Eloped from emergency department (Primary)        ED Disposition Condition    Eloped                 Vipul Reinoso DO  10/13/23 1373     VTE Assessment already completed for this visit

## 2023-11-14 PROBLEM — I10 ESSENTIAL (PRIMARY) HYPERTENSION: Chronic | Status: ACTIVE | Noted: 2023-04-26

## 2023-11-14 PROBLEM — I25.10 ATHEROSCLEROTIC HEART DISEASE OF NATIVE CORONARY ARTERY WITHOUT ANGINA PECTORIS: Chronic | Status: ACTIVE | Noted: 2023-04-26

## 2023-11-16 ENCOUNTER — OUTPATIENT (OUTPATIENT)
Dept: OUTPATIENT SERVICES | Facility: HOSPITAL | Age: 68
LOS: 1 days | Discharge: HOME | End: 2023-11-16
Payer: MEDICARE

## 2023-11-16 DIAGNOSIS — Z98.890 OTHER SPECIFIED POSTPROCEDURAL STATES: Chronic | ICD-10-CM

## 2023-11-16 DIAGNOSIS — R94.31 ABNORMAL ELECTROCARDIOGRAM [ECG] [EKG]: ICD-10-CM

## 2023-11-16 LAB
ANION GAP SERPL CALC-SCNC: 8 MMOL/L — SIGNIFICANT CHANGE UP (ref 7–14)
ANION GAP SERPL CALC-SCNC: 8 MMOL/L — SIGNIFICANT CHANGE UP (ref 7–14)
BUN SERPL-MCNC: 10 MG/DL — SIGNIFICANT CHANGE UP (ref 7–23)
BUN SERPL-MCNC: 10 MG/DL — SIGNIFICANT CHANGE UP (ref 7–23)
CALCIUM SERPL-MCNC: 9.4 MG/DL — SIGNIFICANT CHANGE UP (ref 8.4–10.5)
CALCIUM SERPL-MCNC: 9.4 MG/DL — SIGNIFICANT CHANGE UP (ref 8.4–10.5)
CHLORIDE SERPL-SCNC: 100 MMOL/L — SIGNIFICANT CHANGE UP (ref 98–107)
CHLORIDE SERPL-SCNC: 100 MMOL/L — SIGNIFICANT CHANGE UP (ref 98–107)
CO2 SERPL-SCNC: 28 MMOL/L — SIGNIFICANT CHANGE UP (ref 22–31)
CO2 SERPL-SCNC: 28 MMOL/L — SIGNIFICANT CHANGE UP (ref 22–31)
CREAT SERPL-MCNC: 0.74 MG/DL — SIGNIFICANT CHANGE UP (ref 0.5–1.3)
CREAT SERPL-MCNC: 0.74 MG/DL — SIGNIFICANT CHANGE UP (ref 0.5–1.3)
EGFR: 99 ML/MIN/1.73M2 — SIGNIFICANT CHANGE UP
EGFR: 99 ML/MIN/1.73M2 — SIGNIFICANT CHANGE UP
GLUCOSE BLDC GLUCOMTR-MCNC: 170 MG/DL — HIGH (ref 70–99)
GLUCOSE BLDC GLUCOMTR-MCNC: 170 MG/DL — HIGH (ref 70–99)
GLUCOSE BLDC GLUCOMTR-MCNC: 174 MG/DL — HIGH (ref 70–99)
GLUCOSE BLDC GLUCOMTR-MCNC: 174 MG/DL — HIGH (ref 70–99)
GLUCOSE SERPL-MCNC: 171 MG/DL — HIGH (ref 70–99)
GLUCOSE SERPL-MCNC: 171 MG/DL — HIGH (ref 70–99)
HCT VFR BLD CALC: 39.5 % — SIGNIFICANT CHANGE UP (ref 39–50)
HCT VFR BLD CALC: 39.5 % — SIGNIFICANT CHANGE UP (ref 39–50)
HCT VFR BLDA CALC: 39 % — SIGNIFICANT CHANGE UP
HGB BLD CALC-MCNC: 12.9 G/DL — SIGNIFICANT CHANGE UP (ref 12.6–17.4)
HGB BLD CALC-MCNC: 12.9 G/DL — SIGNIFICANT CHANGE UP (ref 12.6–17.4)
HGB BLD-MCNC: 13.1 G/DL — SIGNIFICANT CHANGE UP (ref 13–17)
HGB BLD-MCNC: 13.1 G/DL — SIGNIFICANT CHANGE UP (ref 13–17)
HGB BLDA-MCNC: 13 G/DL — SIGNIFICANT CHANGE UP (ref 12.6–17.4)
HGB BLDA-MCNC: 13 G/DL — SIGNIFICANT CHANGE UP (ref 12.6–17.4)
MAGNESIUM SERPL-MCNC: 2.1 MG/DL — SIGNIFICANT CHANGE UP (ref 1.6–2.6)
MAGNESIUM SERPL-MCNC: 2.1 MG/DL — SIGNIFICANT CHANGE UP (ref 1.6–2.6)
MCHC RBC-ENTMCNC: 27.5 PG — SIGNIFICANT CHANGE UP (ref 27–34)
MCHC RBC-ENTMCNC: 27.5 PG — SIGNIFICANT CHANGE UP (ref 27–34)
MCHC RBC-ENTMCNC: 33.2 GM/DL — SIGNIFICANT CHANGE UP (ref 32–36)
MCHC RBC-ENTMCNC: 33.2 GM/DL — SIGNIFICANT CHANGE UP (ref 32–36)
MCV RBC AUTO: 82.8 FL — SIGNIFICANT CHANGE UP (ref 80–100)
MCV RBC AUTO: 82.8 FL — SIGNIFICANT CHANGE UP (ref 80–100)
NRBC # BLD: 0 /100 WBCS — SIGNIFICANT CHANGE UP (ref 0–0)
NRBC # BLD: 0 /100 WBCS — SIGNIFICANT CHANGE UP (ref 0–0)
NRBC # FLD: 0 K/UL — SIGNIFICANT CHANGE UP (ref 0–0)
NRBC # FLD: 0 K/UL — SIGNIFICANT CHANGE UP (ref 0–0)
PLATELET # BLD AUTO: 192 K/UL — SIGNIFICANT CHANGE UP (ref 150–400)
PLATELET # BLD AUTO: 192 K/UL — SIGNIFICANT CHANGE UP (ref 150–400)
POTASSIUM SERPL-MCNC: 4.7 MMOL/L — SIGNIFICANT CHANGE UP (ref 3.5–5.3)
POTASSIUM SERPL-MCNC: 4.7 MMOL/L — SIGNIFICANT CHANGE UP (ref 3.5–5.3)
POTASSIUM SERPL-SCNC: 4.7 MMOL/L — SIGNIFICANT CHANGE UP (ref 3.5–5.3)
POTASSIUM SERPL-SCNC: 4.7 MMOL/L — SIGNIFICANT CHANGE UP (ref 3.5–5.3)
RBC # BLD: 4.77 M/UL — SIGNIFICANT CHANGE UP (ref 4.2–5.8)
RBC # BLD: 4.77 M/UL — SIGNIFICANT CHANGE UP (ref 4.2–5.8)
RBC # FLD: 12.8 % — SIGNIFICANT CHANGE UP (ref 10.3–14.5)
RBC # FLD: 12.8 % — SIGNIFICANT CHANGE UP (ref 10.3–14.5)
SAO2 % BLDA: 98 % — SIGNIFICANT CHANGE UP (ref 94–98)
SAO2 % BLDA: 98 % — SIGNIFICANT CHANGE UP (ref 94–98)
SAO2 % BLDV: 75 % — SIGNIFICANT CHANGE UP (ref 67–88)
SAO2 % BLDV: 75 % — SIGNIFICANT CHANGE UP (ref 67–88)
SODIUM SERPL-SCNC: 136 MMOL/L — SIGNIFICANT CHANGE UP (ref 135–145)
SODIUM SERPL-SCNC: 136 MMOL/L — SIGNIFICANT CHANGE UP (ref 135–145)
WBC # BLD: 8.79 K/UL — SIGNIFICANT CHANGE UP (ref 3.8–10.5)
WBC # BLD: 8.79 K/UL — SIGNIFICANT CHANGE UP (ref 3.8–10.5)
WBC # FLD AUTO: 8.79 K/UL — SIGNIFICANT CHANGE UP (ref 3.8–10.5)
WBC # FLD AUTO: 8.79 K/UL — SIGNIFICANT CHANGE UP (ref 3.8–10.5)

## 2023-11-16 PROCEDURE — 93010 ELECTROCARDIOGRAM REPORT: CPT

## 2023-11-16 RX ORDER — SODIUM CHLORIDE 9 MG/ML
1000 INJECTION INTRAMUSCULAR; INTRAVENOUS; SUBCUTANEOUS
Refills: 0 | Status: DISCONTINUED | OUTPATIENT
Start: 2023-11-16 | End: 2023-11-16

## 2023-11-16 RX ORDER — METFORMIN HYDROCHLORIDE 850 MG/1
1 TABLET ORAL
Qty: 0 | Refills: 0 | DISCHARGE

## 2023-11-16 RX ORDER — CLOPIDOGREL BISULFATE 75 MG/1
1 TABLET, FILM COATED ORAL
Refills: 0 | DISCHARGE

## 2023-11-16 RX ORDER — SODIUM CHLORIDE 9 MG/ML
3 INJECTION INTRAMUSCULAR; INTRAVENOUS; SUBCUTANEOUS EVERY 8 HOURS
Refills: 0 | Status: DISCONTINUED | OUTPATIENT
Start: 2023-11-16 | End: 2023-11-16

## 2023-11-16 RX ORDER — SITAGLIPTIN 50 MG/1
1 TABLET, FILM COATED ORAL
Refills: 0 | DISCHARGE

## 2023-11-16 RX ORDER — METOPROLOL TARTRATE 50 MG
1 TABLET ORAL
Qty: 30 | Refills: 0
Start: 2023-11-16 | End: 2023-12-15

## 2023-11-16 RX ORDER — CLOPIDOGREL BISULFATE 75 MG/1
1 TABLET, FILM COATED ORAL
Qty: 90 | Refills: 4
Start: 2023-11-16 | End: 2025-02-07

## 2023-11-16 RX ORDER — LEVOTHYROXINE SODIUM 125 MCG
1 TABLET ORAL
Qty: 0 | Refills: 0 | DISCHARGE

## 2023-11-16 RX ORDER — DAPAGLIFLOZIN 10 MG/1
1 TABLET, FILM COATED ORAL
Refills: 0 | DISCHARGE

## 2023-11-16 RX ORDER — METOPROLOL TARTRATE 50 MG
1 TABLET ORAL
Refills: 0 | DISCHARGE

## 2023-11-16 RX ORDER — GLIMEPIRIDE 1 MG
1 TABLET ORAL
Refills: 0 | DISCHARGE

## 2023-11-16 RX ORDER — ATORVASTATIN CALCIUM 80 MG/1
1 TABLET, FILM COATED ORAL
Refills: 0 | DISCHARGE

## 2023-11-16 RX ORDER — SACUBITRIL AND VALSARTAN 24; 26 MG/1; MG/1
1 TABLET, FILM COATED ORAL
Refills: 0 | DISCHARGE

## 2023-11-16 RX ADMIN — SODIUM CHLORIDE 3 MILLILITER(S): 9 INJECTION INTRAMUSCULAR; INTRAVENOUS; SUBCUTANEOUS at 14:37

## 2023-11-16 RX ADMIN — SODIUM CHLORIDE 75 MILLILITER(S): 9 INJECTION INTRAMUSCULAR; INTRAVENOUS; SUBCUTANEOUS at 14:36

## 2023-11-16 NOTE — H&P CARDIOLOGY - HISTORY OF PRESENT ILLNESS
69 y/o M with PMH of HTN, HLD, DM type II, HFrEF(recent TTE revealed EF of 47% from prior 66%), Hypothyroidism presented to [facility] for LHC due to depressed EF. As per the patient he had a cardiac cath done in 04/23 at [facility] and that time it showed mild CAD. Patient had a TTE done which showed a decrease in his EF from prior TTE so was told he needed a LHC. Patient otherwise endorsed of intermittent ORANTES/SOB and also bilateral LE swelling. Patient stated that he also has cough with white sputum production. Patient otherwise denied any CP, fevers, chills, N/V/D/C, abdominal pain, dysuria, melena, hematochezia, recent travel, sick contact, body aches, pleuritic or positional chest pain.

## 2023-11-16 NOTE — H&P CARDIOLOGY - NSICDXPASTSURGICALHX_GEN_ALL_CORE_FT
PAST SURGICAL HISTORY:  H/O arthroscopy of knee Right    History of umbilical hernia repair     S/P left knee surgery

## 2023-11-16 NOTE — H&P CARDIOLOGY - VENOUS THROMBOEMBOLISM
zithromax as prescribed  Prednisone daily for 5 days  Push Fluids  Plenty of rest  Tylenol and ibuprofen to help with pain or fever  Humidified air can help with congestion  Nasal saline or Flonase nasal spray to help with congestion  Salt water gargles, anesthetic throat spray, or lozenges to help with sore throat.     
no

## 2023-11-21 RX ORDER — FUROSEMIDE 40 MG
1 TABLET ORAL
Refills: 0 | DISCHARGE

## 2023-11-21 RX ORDER — ASPIRIN/CALCIUM CARB/MAGNESIUM 324 MG
1 TABLET ORAL
Refills: 0 | DISCHARGE

## 2023-11-21 RX ORDER — ROSUVASTATIN CALCIUM 5 MG/1
1 TABLET ORAL
Refills: 0 | DISCHARGE

## 2025-01-06 NOTE — H&P PST ADULT - CONSTITUTIONAL DETAILS
Refill Decision Note   Alvin Edwards  is requesting a refill authorization.  Brief Assessment and Rationale for Refill:  Approve     Medication Therapy Plan:         Comments:     Note composed:3:49 PM 01/06/2025            
No care due was identified.  [facility] Embedded Care Due Messages. Reference number: [id number].   1/06/2025 3:18:45 AM CST  
well-groomed/well-developed/well-nourished/obese

## 2025-05-22 DIAGNOSIS — Z13.83 ENCOUNTER FOR SCREENING FOR RESPIRATORY DISORDER NEC: ICD-10-CM

## 2025-07-23 ENCOUNTER — APPOINTMENT (OUTPATIENT)
Dept: PULMONOLOGY | Facility: CLINIC | Age: 70
End: 2025-07-23
Payer: MEDICARE

## 2025-07-23 ENCOUNTER — LABORATORY RESULT (OUTPATIENT)
Age: 70
End: 2025-07-23

## 2025-07-23 VITALS
BODY MASS INDEX: 36.16 KG/M2 | SYSTOLIC BLOOD PRESSURE: 109 MMHG | HEIGHT: 66 IN | WEIGHT: 225 LBS | HEART RATE: 68 BPM | DIASTOLIC BLOOD PRESSURE: 67 MMHG

## 2025-07-23 DIAGNOSIS — J45.909 UNSPECIFIED ASTHMA, UNCOMPLICATED: ICD-10-CM

## 2025-07-23 PROCEDURE — 94060 EVALUATION OF WHEEZING: CPT

## 2025-07-23 PROCEDURE — 36415 COLL VENOUS BLD VENIPUNCTURE: CPT

## 2025-07-23 PROCEDURE — 94726 PLETHYSMOGRAPHY LUNG VOLUMES: CPT

## 2025-07-23 PROCEDURE — 99203 OFFICE O/P NEW LOW 30 MIN: CPT | Mod: 25,GC

## 2025-07-23 PROCEDURE — ZZZZZ: CPT

## 2025-07-23 PROCEDURE — 94729 DIFFUSING CAPACITY: CPT

## 2025-07-23 RX ORDER — BUDESONIDE, GLYCOPYRROLATE, AND FORMOTEROL FUMARATE 160; 9; 4.8 UG/1; UG/1; UG/1
160-9-4.8 AEROSOL, METERED RESPIRATORY (INHALATION)
Qty: 1 | Refills: 5 | Status: DISCONTINUED | COMMUNITY
Start: 2025-07-23 | End: 2025-07-23

## 2025-07-23 RX ORDER — ALBUTEROL SULFATE 90 UG/1
108 (90 BASE) AEROSOL, METERED RESPIRATORY (INHALATION)
Qty: 1 | Refills: 3 | Status: ACTIVE | COMMUNITY
Start: 2025-07-23 | End: 1900-01-01

## 2025-07-23 RX ORDER — FLUTICASONE FUROATE, UMECLIDINIUM BROMIDE AND VILANTEROL TRIFENATATE 100; 62.5; 25 UG/1; UG/1; UG/1
100-62.5-25 POWDER RESPIRATORY (INHALATION)
Qty: 1 | Refills: 5 | Status: ACTIVE | COMMUNITY
Start: 2025-07-23 | End: 1900-01-01

## 2025-07-24 LAB
ANION GAP SERPL CALC-SCNC: 11 MMOL/L
BASOPHILS # BLD AUTO: 0.03 K/UL
BASOPHILS NFR BLD AUTO: 0.3 %
BUN SERPL-MCNC: 14 MG/DL
CALCIUM SERPL-MCNC: 9.3 MG/DL
CHLORIDE SERPL-SCNC: 105 MMOL/L
CO2 SERPL-SCNC: 24 MMOL/L
CREAT SERPL-MCNC: 0.75 MG/DL
EGFRCR SERPLBLD CKD-EPI 2021: 97 ML/MIN/1.73M2
EOSINOPHIL # BLD AUTO: 0.58 K/UL
EOSINOPHIL NFR BLD AUTO: 5 %
GLUCOSE SERPL-MCNC: 190 MG/DL
HCT VFR BLD CALC: 40.5 %
HGB BLD-MCNC: 13.2 G/DL
IMM GRANULOCYTES NFR BLD AUTO: 0.3 %
LYMPHOCYTES # BLD AUTO: 3.65 K/UL
LYMPHOCYTES NFR BLD AUTO: 31.2 %
MAN DIFF?: NORMAL
MCHC RBC-ENTMCNC: 28.2 PG
MCHC RBC-ENTMCNC: 32.6 G/DL
MCV RBC AUTO: 86.5 FL
MONOCYTES # BLD AUTO: 0.9 K/UL
MONOCYTES NFR BLD AUTO: 7.7 %
NEUTROPHILS # BLD AUTO: 6.49 K/UL
NEUTROPHILS NFR BLD AUTO: 55.5 %
PLATELET # BLD AUTO: 176 K/UL
POTASSIUM SERPL-SCNC: 4.5 MMOL/L
RBC # BLD: 4.68 M/UL
RBC # FLD: 13.1 %
SODIUM SERPL-SCNC: 140 MMOL/L
WBC # FLD AUTO: 11.69 K/UL

## 2025-07-25 LAB
A ALTERNATA IGE QN: 0.33 KUA/L
A FUMIGATUS IGE QN: 3.14 KUA/L
C ALBICANS IGE QN: 2.45 KUA/L
C HERBARUM IGE QN: 1.35 KUA/L
CAT DANDER IGE QN: <0.1 KUA/L
COMMON RAGWEED IGE QN: 0.2 KUA/L
D FARINAE IGE QN: 10.2 KUA/L
D PTERONYSS IGE QN: 3.24 KUA/L
DEPRECATED A ALTERNATA IGE RAST QL: NORMAL
DEPRECATED A FUMIGATUS IGE RAST QL: 2
DEPRECATED C ALBICANS IGE RAST QL: 2
DEPRECATED C HERBARUM IGE RAST QL: 2
DEPRECATED CAT DANDER IGE RAST QL: 0
DEPRECATED COMMON RAGWEED IGE RAST QL: NORMAL
DEPRECATED D FARINAE IGE RAST QL: 3
DEPRECATED D PTERONYSS IGE RAST QL: 2
DEPRECATED DOG DANDER IGE RAST QL: 1
DEPRECATED M RACEMOSUS IGE RAST QL: 2
DEPRECATED ROACH IGE RAST QL: 1
DEPRECATED TIMOTHY IGE RAST QL: 0
DEPRECATED WHITE OAK IGE RAST QL: 0
DOG DANDER IGE QN: 0.41 KUA/L
M RACEMOSUS IGE QN: 1.79 KUA/L
ROACH IGE QN: 0.37 KUA/L
TIMOTHY IGE QN: <0.1 KUA/L
TOTAL IGE SMQN RAST: 1678 KU/L
WHITE OAK IGE QN: <0.1 KUA/L